# Patient Record
Sex: MALE | Race: WHITE | URBAN - METROPOLITAN AREA
[De-identification: names, ages, dates, MRNs, and addresses within clinical notes are randomized per-mention and may not be internally consistent; named-entity substitution may affect disease eponyms.]

---

## 2022-05-04 ENCOUNTER — OFFICE VISIT (OUTPATIENT)
Dept: URBAN - METROPOLITAN AREA CLINIC 51 | Facility: CLINIC | Age: 72
End: 2022-05-04
Payer: MEDICARE

## 2022-05-04 DIAGNOSIS — R73.03 BORDERLINE DIABETES: ICD-10-CM

## 2022-05-04 DIAGNOSIS — Z83.511 FAMILY HISTORY OF GLAUCOMA: Primary | ICD-10-CM

## 2022-05-04 DIAGNOSIS — I10 ESSENTIAL (PRIMARY) HYPERTENSION: ICD-10-CM

## 2022-05-04 DIAGNOSIS — H35.011 CHANGES IN RETINAL VASCULAR APPEARANCE, RIGHT EYE: ICD-10-CM

## 2022-05-04 DIAGNOSIS — H53.2 DIPLOPIA: ICD-10-CM

## 2022-05-04 PROCEDURE — 99204 OFFICE O/P NEW MOD 45 MIN: CPT | Performed by: OPTOMETRIST

## 2022-05-04 PROCEDURE — 92250 FUNDUS PHOTOGRAPHY W/I&R: CPT | Performed by: OPTOMETRIST

## 2022-05-04 PROCEDURE — 92133 CPTRZD OPH DX IMG PST SGM ON: CPT | Performed by: OPTOMETRIST

## 2022-05-04 ASSESSMENT — VISUAL ACUITY
OS: 20/20
OD: 20/25

## 2022-05-04 ASSESSMENT — KERATOMETRY
OS: 45.30
OD: 45.61

## 2022-05-04 ASSESSMENT — INTRAOCULAR PRESSURE
OD: 17
OS: 21

## 2022-05-04 NOTE — IMPRESSION/PLAN
Impression: Family history of glaucoma: Z83.511. Plan: Father- glaucoma IOP wnl ou RNfl- brd ou 
will monitor

## 2022-05-04 NOTE — IMPRESSION/PLAN
Impression: Changes in retinal vascular appearance, right eye: H35.011.  Plan: tortuous vessels monitor

## 2022-05-04 NOTE — IMPRESSION/PLAN
Impression: Diplopia: H53.2.  Plan: 1dwn, 2bo distance, with munoz madeleine. 
will bring back for near

## 2022-05-04 NOTE — IMPRESSION/PLAN
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  Plan: will bring back for prism/refraction evaluation

## 2022-05-04 NOTE — IMPRESSION/PLAN
Impression: Essential (primary) hypertension: I10. Plan: Pt had 2 mos ago blood pressure skyrocketed. Ed pt that this can cause fluctuations in vision and muscles.

## 2022-05-04 NOTE — IMPRESSION/PLAN
Impression: Borderline diabetes: R73.03.
-bs can cause fluctuations Plan: No Non-Proliferative Diabetic Retinopathy, no Diabetic Macular Edema and no Neovascularization of the iris, disc, or elsewhere. Discussed ocular and systemic benefits of blood sugar control. Send notes to PCP. Check annually.

## 2022-05-18 ENCOUNTER — OFFICE VISIT (OUTPATIENT)
Dept: URBAN - METROPOLITAN AREA CLINIC 51 | Facility: CLINIC | Age: 72
End: 2022-05-18
Payer: MEDICARE

## 2022-05-18 DIAGNOSIS — H52.4 PRESBYOPIA: ICD-10-CM

## 2022-05-18 DIAGNOSIS — H51.11 CONVERGENCE INSUFFICIENCY: Primary | ICD-10-CM

## 2022-05-18 PROCEDURE — 92060 SENSORIMOTOR EXAMINATION: CPT | Performed by: OPTOMETRIST

## 2022-05-18 PROCEDURE — 99214 OFFICE O/P EST MOD 30 MIN: CPT | Performed by: OPTOMETRIST

## 2022-05-18 ASSESSMENT — VISUAL ACUITY
OS: 20/20
OD: 20/20

## 2022-05-18 NOTE — IMPRESSION/PLAN
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 Plan: 2BO in distance 1BO at near [place] at [address].

## 2023-06-28 ENCOUNTER — OFFICE VISIT (OUTPATIENT)
Dept: URBAN - METROPOLITAN AREA CLINIC 51 | Facility: CLINIC | Age: 73
End: 2023-06-28
Payer: MEDICARE

## 2023-06-28 DIAGNOSIS — H35.011 CHANGES IN RETINAL VASCULAR APPEARANCE, RIGHT EYE: ICD-10-CM

## 2023-06-28 DIAGNOSIS — H53.2 DIPLOPIA: ICD-10-CM

## 2023-06-28 DIAGNOSIS — R73.03 BORDERLINE DIABETES: ICD-10-CM

## 2023-06-28 DIAGNOSIS — H52.4 PRESBYOPIA: ICD-10-CM

## 2023-06-28 DIAGNOSIS — Z83.511 FAMILY HISTORY OF GLAUCOMA: ICD-10-CM

## 2023-06-28 DIAGNOSIS — H35.371 PUCKERING OF MACULA, RIGHT EYE: ICD-10-CM

## 2023-06-28 DIAGNOSIS — H25.13 AGE-RELATED NUCLEAR CATARACT, BILATERAL: Primary | ICD-10-CM

## 2023-06-28 PROCEDURE — 92250 FUNDUS PHOTOGRAPHY W/I&R: CPT | Performed by: OPTOMETRIST

## 2023-06-28 PROCEDURE — 92134 CPTRZ OPH DX IMG PST SGM RTA: CPT | Performed by: OPTOMETRIST

## 2023-06-28 PROCEDURE — 99214 OFFICE O/P EST MOD 30 MIN: CPT | Performed by: OPTOMETRIST

## 2023-06-28 ASSESSMENT — VISUAL ACUITY
OS: 20/20
OD: 20/30

## 2023-06-28 ASSESSMENT — KERATOMETRY
OD: 45.25
OS: 45.13

## 2023-06-28 ASSESSMENT — INTRAOCULAR PRESSURE
OS: 17
OD: 16

## 2023-06-28 NOTE — IMPRESSION/PLAN
Impression: Changes in retinal vascular appearance, right eye: H35.011. Plan: tortuous vessels monitor.

## 2023-06-28 NOTE — IMPRESSION/PLAN
Impression: Family history of glaucoma: Z83.511. Plan: Father- glaucoma IOP wnl ou RNfl- brd ou 
will monitor yearly

## 2023-06-28 NOTE — IMPRESSION/PLAN
Impression: Borderline diabetes: R73.03.
-bs can cause fluctuations Plan: No Non-Proliferative Diabetic Retinopathy, no Diabetic Macular Edema and no Neovascularization of the iris, disc, or elsewhere. Discussed ocular and systemic benefits of blood sugar control. Send notes to PCP.  Check annually

## 2024-06-25 ENCOUNTER — OFFICE VISIT (OUTPATIENT)
Dept: URBAN - METROPOLITAN AREA CLINIC 51 | Facility: CLINIC | Age: 74
End: 2024-06-25
Payer: MEDICARE

## 2024-06-25 DIAGNOSIS — H35.371 PUCKERING OF MACULA, RIGHT EYE: ICD-10-CM

## 2024-06-25 DIAGNOSIS — Z83.511 FAMILY HISTORY OF GLAUCOMA: ICD-10-CM

## 2024-06-25 DIAGNOSIS — H25.13 AGE-RELATED NUCLEAR CATARACT, BILATERAL: ICD-10-CM

## 2024-06-25 DIAGNOSIS — R73.03 BORDERLINE DIABETES: Primary | ICD-10-CM

## 2024-06-25 PROCEDURE — 92133 CPTRZD OPH DX IMG PST SGM ON: CPT | Performed by: OPTOMETRIST

## 2024-06-25 PROCEDURE — 99214 OFFICE O/P EST MOD 30 MIN: CPT | Performed by: OPTOMETRIST

## 2024-06-25 PROCEDURE — 92134 CPTRZ OPH DX IMG PST SGM RTA: CPT | Performed by: OPTOMETRIST

## 2024-06-25 ASSESSMENT — VISUAL ACUITY
OS: 20/20
OD: 20/25

## 2024-06-25 ASSESSMENT — INTRAOCULAR PRESSURE
OS: 15
OD: 15

## 2024-06-25 ASSESSMENT — KERATOMETRY
OD: 45.38
OS: 45.13